# Patient Record
Sex: FEMALE | Race: OTHER | NOT HISPANIC OR LATINO | URBAN - METROPOLITAN AREA
[De-identification: names, ages, dates, MRNs, and addresses within clinical notes are randomized per-mention and may not be internally consistent; named-entity substitution may affect disease eponyms.]

---
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
## 2025-03-31 ENCOUNTER — OFFICE VISIT (OUTPATIENT)
Dept: INTERNAL MEDICINE | Facility: CLINIC | Age: 20
End: 2025-03-31
Payer: COMMERCIAL

## 2025-03-31 VITALS
OXYGEN SATURATION: 98 % | TEMPERATURE: 98 F | BODY MASS INDEX: 30.93 KG/M2 | WEIGHT: 163.81 LBS | HEART RATE: 103 BPM | DIASTOLIC BLOOD PRESSURE: 66 MMHG | SYSTOLIC BLOOD PRESSURE: 108 MMHG | HEIGHT: 61 IN

## 2025-03-31 DIAGNOSIS — F33.1 MODERATE EPISODE OF RECURRENT MAJOR DEPRESSIVE DISORDER: ICD-10-CM

## 2025-03-31 DIAGNOSIS — R53.83 FATIGUE, UNSPECIFIED TYPE: ICD-10-CM

## 2025-03-31 DIAGNOSIS — E03.9 ACQUIRED HYPOTHYROIDISM: ICD-10-CM

## 2025-03-31 DIAGNOSIS — F41.1 GENERALIZED ANXIETY DISORDER WITH PANIC ATTACKS: ICD-10-CM

## 2025-03-31 DIAGNOSIS — E04.1 THYROID NODULE: ICD-10-CM

## 2025-03-31 DIAGNOSIS — Z00.00 ENCOUNTER FOR ANNUAL PHYSICAL EXAM: Primary | ICD-10-CM

## 2025-03-31 DIAGNOSIS — L50.3 DERMATOGRAPHIA: ICD-10-CM

## 2025-03-31 DIAGNOSIS — F41.0 GENERALIZED ANXIETY DISORDER WITH PANIC ATTACKS: ICD-10-CM

## 2025-03-31 DIAGNOSIS — R45.84 ANHEDONIA: ICD-10-CM

## 2025-03-31 PROBLEM — F32.A DEPRESSION: Status: ACTIVE | Noted: 2025-03-31

## 2025-03-31 PROCEDURE — 1159F MED LIST DOCD IN RCRD: CPT | Mod: CPTII,S$GLB,, | Performed by: PHYSICIAN ASSISTANT

## 2025-03-31 PROCEDURE — 3074F SYST BP LT 130 MM HG: CPT | Mod: CPTII,S$GLB,, | Performed by: PHYSICIAN ASSISTANT

## 2025-03-31 PROCEDURE — 1160F RVW MEDS BY RX/DR IN RCRD: CPT | Mod: CPTII,S$GLB,, | Performed by: PHYSICIAN ASSISTANT

## 2025-03-31 PROCEDURE — 3008F BODY MASS INDEX DOCD: CPT | Mod: CPTII,S$GLB,, | Performed by: PHYSICIAN ASSISTANT

## 2025-03-31 PROCEDURE — 99204 OFFICE O/P NEW MOD 45 MIN: CPT | Mod: S$GLB,,, | Performed by: PHYSICIAN ASSISTANT

## 2025-03-31 PROCEDURE — 3078F DIAST BP <80 MM HG: CPT | Mod: CPTII,S$GLB,, | Performed by: PHYSICIAN ASSISTANT

## 2025-03-31 PROCEDURE — 99999 PR PBB SHADOW E&M-EST. PATIENT-LVL IV: CPT | Mod: PBBFAC,,, | Performed by: PHYSICIAN ASSISTANT

## 2025-03-31 PROCEDURE — G2211 COMPLEX E/M VISIT ADD ON: HCPCS | Mod: S$GLB,,, | Performed by: PHYSICIAN ASSISTANT

## 2025-03-31 RX ORDER — DROSPIRENONE AND ETHINYL ESTRADIOL TABLETS 0.02-3(28)
1 KIT ORAL
COMMUNITY
Start: 2024-12-29

## 2025-03-31 RX ORDER — LEVOTHYROXINE SODIUM 75 UG/1
75 TABLET ORAL
Qty: 90 TABLET | Refills: 3 | Status: SHIPPED | OUTPATIENT
Start: 2025-03-31 | End: 2026-03-31

## 2025-03-31 NOTE — PROGRESS NOTES
"  Subjective:      Patient ID: Steff Richardson is a 20 y.o. female.    Chief Complaint: Establish Care      History of Present Illness    CHIEF COMPLAINT:  Ms. Richardson presents today to establish primary care and discuss concerns about hypothyroidism    HYPOTHYROIDISM:  She was diagnosed with hypothyroidism one year ago at age 19. She initially experienced symptom improvement during the first 6 months of levothyroxine 50mcg treatment, but subsequently experienced regression of symptoms to baseline or worse. Last thyroid function check was approximately 6 months ago with fluctuating results. She reports mood changes with fluctuations between "okay" and "bad" days, fatigue, alternating constipation and diarrhea, and increased hair loss from historically thick hair. She experiences random palpitations, particularly when she has not eaten. She reports morning night sweats with whole body heat and wet bed sheets. Weight fluctuations of approximately 10 lbs monthly are reported, with recent increases noted. She has difficulty falling asleep at night and experiences daytime somnolence, even with 8-9 hours of sleep.  Reports normal menstrual periods.     FAMILY HISTORY:  Mother and sister have hypothyroidism, and grandmother has rheumatoid arthritis.    MEDICATIONS:  She takes levothyroxine 50mcg and has been on birth control for 3 years.    Medications were reviewed      Depression  Visit Type: initial  Onset of symptoms: more than 5 years ago  Progression since onset: gradually worsening  Patient presents with the following symptoms: anhedonia, depressed mood, excessive worry, fatigue, feelings of hopelessness, insomnia, irritability, nervousness/anxiety, palpitations, panic, suicidal ideas and weight gain.  Patient is not experiencing: choking sensation, confusion, decreased concentration, hyperventilation, impotence, psychomotor agitation, psychomotor retardation, restlessness, shortness of breath, suicidal planning, " thoughts of death and weight loss.  Frequency of symptoms: most days   Severity: causing significant distress   Exacerbated by: starting college.  Sleep quality: fair  Nighttime awakenings: none  Patient has a history of: anxiety/panic attacks, depression and mental illness  No history of: anemia, arrhythmia, asthma, bipolar disorder, CAD, CHF, chronic lung disease, fibromyalgia, hyperthyroidism, suicide attempt and substance abuse  Treatment tried: nothing          Problem List[1]    Current Medications[2]    Family History   Problem Relation Name Age of Onset    Hypothyroidism Mother      No Known Problems Father      Hypothyroidism Sister      Heart disease Paternal Grandfather        Review of Systems   Constitutional:  Positive for activity change, appetite change, diaphoresis, fatigue, irritability, unexpected weight change and weight gain. Negative for chills, fever and weight loss.   HENT: Negative.  Negative for congestion, hearing loss, postnasal drip, rhinorrhea, sore throat, trouble swallowing and voice change.    Eyes: Negative.  Negative for visual disturbance.   Respiratory: Negative.  Negative for cough, choking, chest tightness and shortness of breath.    Cardiovascular:  Positive for palpitations. Negative for chest pain and leg swelling.   Gastrointestinal:  Positive for constipation and diarrhea. Negative for abdominal distention, abdominal pain, blood in stool, nausea and vomiting.   Endocrine: Positive for heat intolerance. Negative for cold intolerance, polydipsia and polyuria.   Genitourinary: Negative.  Negative for difficulty urinating, frequency and impotence.   Musculoskeletal:  Negative for arthralgias, back pain, gait problem, joint swelling and myalgias.   Skin:  Negative for color change, pallor, rash and wound.   Neurological:  Negative for dizziness, tremors, weakness, light-headedness, numbness and headaches.   Hematological:  Negative for adenopathy.   Psychiatric/Behavioral:   "Positive for depression, dysphoric mood and suicidal ideas. Negative for agitation, behavioral problems, confusion, decreased concentration, hallucinations, self-injury, sleep disturbance and substance abuse. The patient is nervous/anxious and has insomnia. The patient is not hyperactive.      Objective:   /66 (BP Location: Left arm, Patient Position: Sitting)   Pulse 103   Temp 98.1 °F (36.7 °C) (Oral)   Ht 5' 1" (1.549 m)   Wt 74.3 kg (163 lb 12.8 oz)   LMP 03/23/2025 (Exact Date)   SpO2 98%   BMI 30.95 kg/m²     Physical Exam  Vitals reviewed.   Constitutional:       General: She is not in acute distress.     Appearance: Normal appearance. She is well-developed. She is not ill-appearing, toxic-appearing or diaphoretic.   HENT:      Head: Normocephalic and atraumatic.      Right Ear: External ear normal.      Left Ear: External ear normal.      Nose: Nose normal.   Eyes:      Conjunctiva/sclera: Conjunctivae normal.      Pupils: Pupils are equal, round, and reactive to light.   Neck:      Thyroid: Thyroid mass present. No thyromegaly or thyroid tenderness.      Comments: Thyroid nodule  Cardiovascular:      Rate and Rhythm: Normal rate and regular rhythm.      Heart sounds: Normal heart sounds. No murmur heard.     No friction rub. No gallop.   Pulmonary:      Effort: Pulmonary effort is normal. No respiratory distress.      Breath sounds: Normal breath sounds. No wheezing or rales.   Chest:      Chest wall: No tenderness.   Abdominal:      General: There is no distension.      Palpations: Abdomen is soft.      Tenderness: There is no abdominal tenderness.   Musculoskeletal:         General: Normal range of motion.      Cervical back: Normal range of motion and neck supple.   Lymphadenopathy:      Cervical: No cervical adenopathy.   Skin:     General: Skin is warm and dry.      Capillary Refill: Capillary refill takes less than 2 seconds.      Findings: No rash.      Comments: Dermatographia noted "   Neurological:      Mental Status: She is alert and oriented to person, place, and time.      Motor: No weakness.      Coordination: Coordination normal.      Gait: Gait normal.   Psychiatric:         Mood and Affect: Mood normal.         Behavior: Behavior normal.         Thought Content: Thought content normal.         Judgment: Judgment normal.         Assessment:     1. Encounter for annual physical exam    2. Acquired hypothyroidism    3. Thyroid nodule    4. Fatigue, unspecified type    5. Moderate episode of recurrent major depressive disorder    6. Generalized anxiety disorder with panic attacks    7. Anhedonia    8. Dermatographia      Plan:   Encounter for annual physical exam  -     BRETT Screen w/Reflex; Future; Expected date: 03/31/2025  -     CBC Auto Differential; Future; Expected date: 03/31/2025  -     Comprehensive Metabolic Panel; Future  -     Lipid Panel; Future; Expected date: 03/31/2025  -     Vitamin D; Future; Expected date: 03/31/2025  -     Thyrotropin Receptor Antibody; Future; Expected date: 03/31/2025  -     Cancel: Thyroid Peroxidase Antibody; Future; Expected date: 03/31/2025  -     US Soft Tissue Head Neck; Future; Expected date: 03/31/2025  -     Vitamin B12; Future; Expected date: 03/31/2025  Suspect thyroid dysfunction causing symptoms including mood swings, fatigue, weight fluctuations, and palpitations.  Consider autoimmune thyroid disease (e.g. Hashimoto's) given family history and presence of dermatographism.  Plan to evaluate thyroid function comprehensively with labs including TSH, free T3, free T4, and thyroid antibodies.  Will assess for other potential causes of symptoms including vitamin deficiencies, anemia, and metabolic issues.  If thyroid function normal, may consider depression as alternative diagnosis and discuss treatment options.  Discussed potential autoimmune link with dermatographia and its relevance to thyroid disease.    Thyroid nodule  Acquired  hypothyroidism  -     Thyrotropin Receptor Antibody; Future; Expected date: 03/31/2025  -     US Soft Tissue Head Neck; Future; Expected date: 03/31/2025  -     T4, Free; Future; Expected date: 03/31/2025  -     T3; Future; Expected date: 03/31/2025  -     TSH; Future; Expected date: 03/31/2025  -     SYNTHROID 75 mcg tablet; Take 1 tablet (75 mcg total) by mouth before breakfast.  Dispense: 90 tablet; Refill: 3  -     Anti-Thyroglobulin Antibody; Future; Expected date: 03/31/2025  -     Thyroid Peroxidase and Thyroglobulin Ab; Future; Expected date: 03/31/2025  - Assessed the patient's current hypothyroidism management and symptoms.  - Noted the patient has been on levothyroxine 75mcg, with last thyroid check approximately 6 months ago and fluctuating thyroid levels.  - Observed symptoms including mood swings, depression, fatigue, hair loss, dry skin, palpitations, weight fluctuations, night sweats, and difficulty falling asleep.  - Noted dermatographism on patient's skin, potentially linked to autoimmune features.  - Explained importance of consistent levothyroxine administration (empty stomach, 30 minutes before eating or taking any supplements) for proper absorption.  - Educated on variability in individual response to different thyroid medication formulations (generic vs. brand name).  - Prescribed Synthroid (brand name levothyroxine) 50 mcg daily to replace current levothyroxine, pending lab results    Fatigue, unspecified type  -     BRETT Screen w/Reflex; Future; Expected date: 03/31/2025  -     Insulin, Random; Future; Expected date: 03/31/2025    Depression, unspecified depression type  Generalized anxiety disorder with panic attacks    Anhedonia  -     Vitamin B12; Future; Expected date: 03/31/2025    Dermatographia  -reassurance      FAMILY HISTORY:  - Noted family history of hypothyroidism and rheumatoid arthritis       FOLLOW-UP:  - Scheduled follow up in 1 month to review lab results and reassess  symptoms.       This note was generated with the assistance of ambient listening technology. Verbal consent was obtained by the patient and accompanying visitor(s) for the recording of patient appointment to facilitate this note. I attest to having reviewed and edited the generated note for accuracy, though some syntax or spelling errors may persist. Please contact the author of this note for any clarification.    Follow up in about 4 weeks (around 4/28/2025), or if symptoms worsen or fail to improve.           [1]   Patient Active Problem List  Diagnosis    Thyroid disease    Dermatographia    Anhedonia    Generalized anxiety disorder with panic attacks    Depression    Fatigue   [2]   Current Outpatient Medications:     LORYNA, 28, 3-0.02 mg per tablet, Take 1 tablet by mouth., Disp: , Rfl:     spironolactone (ALDACTONE) 50 MG tablet, Take 50 mg by mouth once daily., Disp: , Rfl:     SYNTHROID 75 mcg tablet, Take 1 tablet (75 mcg total) by mouth before breakfast., Disp: 90 tablet, Rfl: 3

## 2025-04-01 ENCOUNTER — LAB VISIT (OUTPATIENT)
Dept: LAB | Facility: HOSPITAL | Age: 20
End: 2025-04-01
Payer: COMMERCIAL

## 2025-04-01 ENCOUNTER — RESULTS FOLLOW-UP (OUTPATIENT)
Dept: INTERNAL MEDICINE | Facility: CLINIC | Age: 20
End: 2025-04-01

## 2025-04-01 DIAGNOSIS — E03.9 ACQUIRED HYPOTHYROIDISM: ICD-10-CM

## 2025-04-01 DIAGNOSIS — E06.3 HASHIMOTO THYROIDITIS: Primary | ICD-10-CM

## 2025-04-01 DIAGNOSIS — R45.84 ANHEDONIA: ICD-10-CM

## 2025-04-01 DIAGNOSIS — R53.83 FATIGUE, UNSPECIFIED TYPE: ICD-10-CM

## 2025-04-01 DIAGNOSIS — Z00.00 ENCOUNTER FOR ANNUAL PHYSICAL EXAM: ICD-10-CM

## 2025-04-01 LAB
25(OH)D3+25(OH)D2 SERPL-MCNC: 50 NG/ML (ref 30–96)
ABSOLUTE EOSINOPHIL (OHS): 0.08 K/UL
ABSOLUTE MONOCYTE (OHS): 0.53 K/UL (ref 0.3–1)
ABSOLUTE NEUTROPHIL COUNT (OHS): 5.55 K/UL (ref 1.8–7.7)
ALBUMIN SERPL BCP-MCNC: 3.5 G/DL (ref 3.5–5.2)
ALP SERPL-CCNC: 84 UNIT/L (ref 40–150)
ALT SERPL W/O P-5'-P-CCNC: 13 UNIT/L (ref 10–44)
ANION GAP (OHS): 9 MMOL/L (ref 8–16)
AST SERPL-CCNC: 14 UNIT/L (ref 11–45)
BASOPHILS # BLD AUTO: 0.04 K/UL
BASOPHILS NFR BLD AUTO: 0.5 %
BILIRUB SERPL-MCNC: 0.3 MG/DL (ref 0.1–1)
BUN SERPL-MCNC: 11 MG/DL (ref 6–20)
CALCIUM SERPL-MCNC: 9.2 MG/DL (ref 8.7–10.5)
CHLORIDE SERPL-SCNC: 104 MMOL/L (ref 95–110)
CHOLEST SERPL-MCNC: 149 MG/DL (ref 120–199)
CHOLEST/HDLC SERPL: 2.3 {RATIO} (ref 2–5)
CO2 SERPL-SCNC: 24 MMOL/L (ref 23–29)
CREAT SERPL-MCNC: 0.8 MG/DL (ref 0.5–1.4)
ERYTHROCYTE [DISTWIDTH] IN BLOOD BY AUTOMATED COUNT: 11.9 % (ref 11.5–14.5)
GFR SERPLBLD CREATININE-BSD FMLA CKD-EPI: >60 ML/MIN/1.73/M2
GLUCOSE SERPL-MCNC: 84 MG/DL (ref 70–110)
HCT VFR BLD AUTO: 41.1 % (ref 37–48.5)
HDLC SERPL-MCNC: 66 MG/DL (ref 40–75)
HDLC SERPL: 44.3 % (ref 20–50)
HGB BLD-MCNC: 13.8 GM/DL (ref 12–16)
IMM GRANULOCYTES # BLD AUTO: 0.02 K/UL (ref 0–0.04)
IMM GRANULOCYTES NFR BLD AUTO: 0.2 % (ref 0–0.5)
INSULIN SERPL-ACNC: 7.9 UU/ML
LDLC SERPL CALC-MCNC: 63.8 MG/DL (ref 63–159)
LYMPHOCYTES # BLD AUTO: 2.47 K/UL (ref 1–4.8)
MCH RBC QN AUTO: 27.9 PG (ref 27–31)
MCHC RBC AUTO-ENTMCNC: 33.6 G/DL (ref 32–36)
MCV RBC AUTO: 83 FL (ref 82–98)
NONHDLC SERPL-MCNC: 83 MG/DL
NUCLEATED RBC (/100WBC) (OHS): 0 /100 WBC
PLATELET # BLD AUTO: 269 K/UL (ref 150–450)
PMV BLD AUTO: 10 FL (ref 9.2–12.9)
POTASSIUM SERPL-SCNC: 3.9 MMOL/L (ref 3.5–5.1)
PROT SERPL-MCNC: 7.8 GM/DL (ref 6–8.4)
RBC # BLD AUTO: 4.95 M/UL (ref 4–5.4)
RELATIVE EOSINOPHIL (OHS): 0.9 %
RELATIVE LYMPHOCYTE (OHS): 28.4 % (ref 18–48)
RELATIVE MONOCYTE (OHS): 6.1 % (ref 4–15)
RELATIVE NEUTROPHIL (OHS): 63.9 % (ref 38–73)
SODIUM SERPL-SCNC: 137 MMOL/L (ref 136–145)
T3FREE SERPL-MCNC: 154 NG/DL (ref 60–180)
T4 FREE SERPL-MCNC: 1.34 NG/DL (ref 0.71–1.51)
THYROGLOB AB SERPL IA-ACNC: <4 IU/ML (ref 0–3.9)
TRIGL SERPL-MCNC: 96 MG/DL (ref 30–150)
TSH SERPL-ACNC: 2.47 UIU/ML (ref 0.4–4)
VIT B12 SERPL-MCNC: 391 PG/ML (ref 210–950)
WBC # BLD AUTO: 8.69 K/UL (ref 3.9–12.7)

## 2025-04-01 PROCEDURE — 86376 MICROSOMAL ANTIBODY EACH: CPT

## 2025-04-01 PROCEDURE — 36415 COLL VENOUS BLD VENIPUNCTURE: CPT

## 2025-04-01 PROCEDURE — 86800 THYROGLOBULIN ANTIBODY: CPT

## 2025-04-01 PROCEDURE — 86235 NUCLEAR ANTIGEN ANTIBODY: CPT | Mod: 59

## 2025-04-01 PROCEDURE — 84443 ASSAY THYROID STIM HORMONE: CPT

## 2025-04-01 PROCEDURE — 84439 ASSAY OF FREE THYROXINE: CPT

## 2025-04-01 PROCEDURE — 83525 ASSAY OF INSULIN: CPT

## 2025-04-01 PROCEDURE — 86038 ANTINUCLEAR ANTIBODIES: CPT

## 2025-04-01 PROCEDURE — 84450 TRANSFERASE (AST) (SGOT): CPT

## 2025-04-01 PROCEDURE — 82607 VITAMIN B-12: CPT

## 2025-04-01 PROCEDURE — 80061 LIPID PANEL: CPT

## 2025-04-01 PROCEDURE — 83520 IMMUNOASSAY QUANT NOS NONAB: CPT

## 2025-04-01 PROCEDURE — 86225 DNA ANTIBODY NATIVE: CPT

## 2025-04-01 PROCEDURE — 85025 COMPLETE CBC W/AUTO DIFF WBC: CPT

## 2025-04-01 PROCEDURE — 82306 VITAMIN D 25 HYDROXY: CPT

## 2025-04-01 PROCEDURE — 86235 NUCLEAR ANTIGEN ANTIBODY: CPT

## 2025-04-01 PROCEDURE — 82947 ASSAY GLUCOSE BLOOD QUANT: CPT

## 2025-04-01 PROCEDURE — 84480 ASSAY TRIIODOTHYRONINE (T3): CPT

## 2025-04-02 ENCOUNTER — HOSPITAL ENCOUNTER (OUTPATIENT)
Dept: RADIOLOGY | Facility: HOSPITAL | Age: 20
Discharge: HOME | End: 2025-04-02
Attending: PHYSICIAN ASSISTANT
Payer: COMMERCIAL

## 2025-04-02 DIAGNOSIS — Z00.00 ENCOUNTER FOR ANNUAL PHYSICAL EXAM: ICD-10-CM

## 2025-04-02 DIAGNOSIS — E03.9 ACQUIRED HYPOTHYROIDISM: ICD-10-CM

## 2025-04-02 LAB
ANA (OHS): POSITIVE
ANA PATTERN 1 (OHS): ABNORMAL
ANA TITER 1 (OHS): ABNORMAL
THYROGLOB AB SERPL IA-ACNC: <1.8 IU/ML
THYROPEROXIDASE AB SERPL-ACNC: 0.3 IU/ML
TSH RECEP AB SER-ACNC: <1.1 IU/L (ref 0–1.75)

## 2025-04-02 PROCEDURE — 76536 US EXAM OF HEAD AND NECK: CPT | Mod: TC

## 2025-04-02 PROCEDURE — 76536 US EXAM OF HEAD AND NECK: CPT | Mod: 26,,, | Performed by: RADIOLOGY

## 2025-04-03 LAB
DSDNA ANTIBODY (OHS): NORMAL
DSDNA ANTIBODY TITER (OHS): NORMAL
SM  ANTIBODY (OHS): 0.11 RATIO
SM INTERPRETATION (OHS): NEGATIVE
SM/RNP ANTIBODY (OHS): 0.08 RATIO
SM/RNP INTERPRETATION (OHS): NEGATIVE
SSB  ANTIBODY (OHS): 0.11 RATIO
SSB INTERPRETATION (OHS): NEGATIVE

## 2025-04-04 ENCOUNTER — PATIENT MESSAGE (OUTPATIENT)
Dept: INTERNAL MEDICINE | Facility: CLINIC | Age: 20
End: 2025-04-04
Payer: COMMERCIAL

## 2025-04-04 ENCOUNTER — TELEPHONE (OUTPATIENT)
Dept: INTERNAL MEDICINE | Facility: CLINIC | Age: 20
End: 2025-04-04
Payer: COMMERCIAL

## 2025-04-04 DIAGNOSIS — R76.8 POSITIVE ANA (ANTINUCLEAR ANTIBODY): Primary | ICD-10-CM

## 2025-04-04 LAB
SSA  ANTIBODY (OHS): 0.09 RATIO
SSA INTERPRETATION (OHS): NEGATIVE

## 2025-04-04 NOTE — TELEPHONE ENCOUNTER
----- Message from Delia sent at 4/4/2025 11:03 AM CDT -----  Contact: ONI NAVA [[id number]]  .Type:  Patient Returning CallWho Called:ONI NAVA [[id number]]Who Left Message for Patient:Lexii Luna the patient know what this is regarding?:LabsWould the patient rather a call back or a response via [employer]? CallBe Call Back Number:[phone] Information: Patient would like call back

## 2025-04-07 ENCOUNTER — PATIENT MESSAGE (OUTPATIENT)
Dept: INTERNAL MEDICINE | Facility: CLINIC | Age: 20
End: 2025-04-07
Payer: COMMERCIAL

## 2025-04-08 ENCOUNTER — PATIENT MESSAGE (OUTPATIENT)
Dept: INTERNAL MEDICINE | Facility: CLINIC | Age: 20
End: 2025-04-08
Payer: COMMERCIAL

## 2025-04-09 ENCOUNTER — PATIENT MESSAGE (OUTPATIENT)
Dept: INTERNAL MEDICINE | Facility: CLINIC | Age: 20
End: 2025-04-09
Payer: COMMERCIAL

## 2025-04-09 DIAGNOSIS — E88.819 INSULIN RESISTANCE: Primary | ICD-10-CM

## 2025-04-09 RX ORDER — METFORMIN HYDROCHLORIDE 500 MG/1
500 TABLET, EXTENDED RELEASE ORAL
Qty: 90 TABLET | Refills: 0 | Status: SHIPPED | OUTPATIENT
Start: 2025-04-09 | End: 2026-04-09

## 2025-04-29 ENCOUNTER — OFFICE VISIT (OUTPATIENT)
Dept: INTERNAL MEDICINE | Facility: CLINIC | Age: 20
End: 2025-04-29
Payer: COMMERCIAL

## 2025-04-29 VITALS
HEART RATE: 81 BPM | HEIGHT: 61 IN | DIASTOLIC BLOOD PRESSURE: 70 MMHG | TEMPERATURE: 98 F | OXYGEN SATURATION: 98 % | WEIGHT: 161.81 LBS | SYSTOLIC BLOOD PRESSURE: 126 MMHG | BODY MASS INDEX: 30.55 KG/M2

## 2025-04-29 DIAGNOSIS — R45.84 ANHEDONIA: ICD-10-CM

## 2025-04-29 DIAGNOSIS — R61 NIGHT SWEATS: ICD-10-CM

## 2025-04-29 DIAGNOSIS — F32.0 CURRENT MILD EPISODE OF MAJOR DEPRESSIVE DISORDER, UNSPECIFIED WHETHER RECURRENT: ICD-10-CM

## 2025-04-29 DIAGNOSIS — E04.1 THYROID CYST: ICD-10-CM

## 2025-04-29 DIAGNOSIS — F41.1 GENERALIZED ANXIETY DISORDER: Primary | ICD-10-CM

## 2025-04-29 DIAGNOSIS — R53.83 FATIGUE, UNSPECIFIED TYPE: ICD-10-CM

## 2025-04-29 DIAGNOSIS — E88.819 INSULIN RESISTANCE: ICD-10-CM

## 2025-04-29 DIAGNOSIS — R76.8 POSITIVE ANA (ANTINUCLEAR ANTIBODY): ICD-10-CM

## 2025-04-29 PROCEDURE — 3074F SYST BP LT 130 MM HG: CPT | Mod: CPTII,S$GLB,, | Performed by: PHYSICIAN ASSISTANT

## 2025-04-29 PROCEDURE — 1159F MED LIST DOCD IN RCRD: CPT | Mod: CPTII,S$GLB,, | Performed by: PHYSICIAN ASSISTANT

## 2025-04-29 PROCEDURE — 99214 OFFICE O/P EST MOD 30 MIN: CPT | Mod: S$GLB,,, | Performed by: PHYSICIAN ASSISTANT

## 2025-04-29 PROCEDURE — 3078F DIAST BP <80 MM HG: CPT | Mod: CPTII,S$GLB,, | Performed by: PHYSICIAN ASSISTANT

## 2025-04-29 PROCEDURE — 99999 PR PBB SHADOW E&M-EST. PATIENT-LVL III: CPT | Mod: PBBFAC,,, | Performed by: PHYSICIAN ASSISTANT

## 2025-04-29 PROCEDURE — 3008F BODY MASS INDEX DOCD: CPT | Mod: CPTII,S$GLB,, | Performed by: PHYSICIAN ASSISTANT

## 2025-04-29 RX ORDER — METFORMIN HYDROCHLORIDE 500 MG/1
1000 TABLET, EXTENDED RELEASE ORAL
Qty: 180 TABLET | Refills: 3 | Status: SHIPPED | OUTPATIENT
Start: 2025-04-29 | End: 2026-04-29

## 2025-04-29 RX ORDER — SERTRALINE HYDROCHLORIDE 50 MG/1
50 TABLET, FILM COATED ORAL DAILY
Qty: 30 EACH | Refills: 11 | Status: SHIPPED | OUTPATIENT
Start: 2025-04-29 | End: 2026-04-29

## 2025-04-29 NOTE — PROGRESS NOTES
Subjective:      Patient ID: Steff Richardson is a 20 y.o. female.    Chief Complaint: Follow-up    HPI  History of Present Illness    CHIEF COMPLAINT:  Ms. Richardson presents today for follow up of lab results and medication management.    LABS AND IMAGING:  BRETT test was positive but nonspecific. Thyroid function tests were normal. Thyroid ultrasound showed a small cyst.    MEDICATIONS:  She tolerates Metformin well without side effects. She started brand thyroid medication 2-3 weeks ago. She has been on birth control for approximately 3 years. She was previously prescribed Zoloft but never initiated it, though now expresses interest in starting mental health medication with preference for a non-lifelong treatment option.    GYNECOLOGIC:  She reports medium to heavy menstrual periods with recent post-menstrual spotting. She denies pelvic pain or passing clots.    CONSTITUTIONAL:  She experiences intermittent night sweats that worsen with increased stress levels.    Medications were reviewed        Problem List[1]    Current Medications[2]    Review of Systems   Constitutional:  Positive for diaphoresis. Negative for activity change, appetite change, chills, fatigue, fever and unexpected weight change.   HENT: Negative.  Negative for congestion, hearing loss, postnasal drip, rhinorrhea, sore throat, trouble swallowing and voice change.    Eyes: Negative.  Negative for visual disturbance.   Respiratory: Negative.  Negative for cough, choking, chest tightness and shortness of breath.    Cardiovascular:  Negative for chest pain, palpitations and leg swelling.   Gastrointestinal:  Negative for abdominal distention, abdominal pain, blood in stool, constipation, diarrhea, nausea and vomiting.   Endocrine: Negative for cold intolerance, heat intolerance, polydipsia and polyuria.   Genitourinary:  Positive for menstrual problem. Negative for difficulty urinating and frequency.   Musculoskeletal:  Negative for arthralgias, back  "pain, gait problem, joint swelling and myalgias.   Skin:  Negative for color change, pallor, rash and wound.   Neurological:  Negative for dizziness, tremors, weakness, light-headedness, numbness and headaches.   Hematological:  Negative for adenopathy.   Psychiatric/Behavioral:  Positive for dysphoric mood. Negative for behavioral problems, confusion, self-injury, sleep disturbance and suicidal ideas. The patient is nervous/anxious.      Objective:   /70 (BP Location: Left arm, Patient Position: Sitting)   Pulse 81   Temp 98.1 °F (36.7 °C) (Tympanic)   Ht 5' 1" (1.549 m)   Wt 73.4 kg (161 lb 13.1 oz)   LMP 04/20/2025 (Exact Date)   SpO2 98%   BMI 30.58 kg/m²     Physical Exam  Vitals reviewed.   Constitutional:       General: She is not in acute distress.     Appearance: Normal appearance. She is well-developed. She is not ill-appearing, toxic-appearing or diaphoretic.   HENT:      Head: Normocephalic and atraumatic.      Right Ear: External ear normal.      Left Ear: External ear normal.      Nose: Nose normal.   Eyes:      Conjunctiva/sclera: Conjunctivae normal.      Pupils: Pupils are equal, round, and reactive to light.   Cardiovascular:      Rate and Rhythm: Normal rate and regular rhythm.      Heart sounds: Normal heart sounds. No murmur heard.     No friction rub. No gallop.   Pulmonary:      Effort: Pulmonary effort is normal. No respiratory distress.      Breath sounds: Normal breath sounds. No wheezing or rales.   Chest:      Chest wall: No tenderness.   Abdominal:      General: There is no distension.      Palpations: Abdomen is soft.      Tenderness: There is no abdominal tenderness.   Musculoskeletal:         General: Normal range of motion.      Cervical back: Normal range of motion and neck supple.   Lymphadenopathy:      Cervical: No cervical adenopathy.   Skin:     General: Skin is warm and dry.      Capillary Refill: Capillary refill takes less than 2 seconds.      Findings: No rash. "   Neurological:      Mental Status: She is alert and oriented to person, place, and time.      Motor: No weakness.      Coordination: Coordination normal.      Gait: Gait normal.   Psychiatric:         Mood and Affect: Mood normal.         Behavior: Behavior normal.         Thought Content: Thought content normal.         Judgment: Judgment normal.       Lab Visit on 04/01/2025   Component Date Value Ref Range Status    BRETT 04/01/2025 Positive (A)  Negative <1:80 Final    BRETT Titer 1 04/01/2025 1:1280   Final    BRETT Pattern 1  04/01/2025 Homogeneous   Final    Sodium 04/01/2025 137  136 - 145 mmol/L Final    Potassium 04/01/2025 3.9  3.5 - 5.1 mmol/L Final    Chloride 04/01/2025 104  95 - 110 mmol/L Final    CO2 04/01/2025 24  23 - 29 mmol/L Final    Glucose 04/01/2025 84  70 - 110 mg/dL Final    BUN 04/01/2025 11  6 - 20 mg/dL Final    Creatinine 04/01/2025 0.8  0.5 - 1.4 mg/dL Final    Calcium 04/01/2025 9.2  8.7 - 10.5 mg/dL Final    Protein Total 04/01/2025 7.8  6.0 - 8.4 gm/dL Final    Albumin 04/01/2025 3.5  3.5 - 5.2 g/dL Final    Bilirubin Total 04/01/2025 0.3  0.1 - 1.0 mg/dL Final    For infants and newborns, interpretation of results should be based   on gestational age, weight and in agreement with clinical   observations.    Premature Infant recommended reference ranges:   0-24 hours:  <8.0 mg/dL   24-48 hours: <12.0 mg/dL   3-5 days:    <15.0 mg/dL   6-29 days:   <15.0 mg/dL    ALP 04/01/2025 84  40 - 150 unit/L Final    AST 04/01/2025 14  11 - 45 unit/L Final    ALT 04/01/2025 13  10 - 44 unit/L Final    Anion Gap 04/01/2025 9  8 - 16 mmol/L Final    eGFR 04/01/2025 >60  >60 mL/min/1.73/m2 Final    Estimated GFR calculated using the CKD-EPI creatinine (2021) equation.    Cholesterol Total 04/01/2025 149  120 - 199 mg/dL Final    The National Cholesterol Education Program (NCEP) has set the  following guidelines (reference ranges) for Cholesterol:  Optimal.....................<200 mg/dL  Borderline  High.............200-239 mg/dL  High........................> or = 240 mg/dL    Triglyceride 04/01/2025 96  30 - 150 mg/dL Final    The National Cholesterol Education Program (NCEP) has set the  following guidelines (reference values) for triglycerides:  Normal......................<150 mg/dL  Borderline High.............150-199 mg/dL  High........................200-499 mg/dL    HDL Cholesterol 04/01/2025 66  40 - 75 mg/dL Final    The National Cholesterol Education Program (NCEP) has set the   following guidelines (reference values) for HDL Cholesterol:   Low...............<40 mg/dL   Optimal...........>60 mg/dL    LDL Cholesterol 04/01/2025 63.8  63.0 - 159.0 mg/dL Final    The National Cholesterol Education Program (NCEP) has set the  following guidelines (reference values) for LDL Cholesterol:  Optimal.......................<130 mg/dL  Borderline High...............130-159 mg/dL  High..........................160-189 mg/dL  Very High.....................>190 mg/dL  LDL calculated using the Friedewald equation.    HDL/Cholesterol Ratio 04/01/2025 44.3  20.0 - 50.0 % Final    Cholesterol/HDL Ratio 04/01/2025 2.3  2.0 - 5.0 Final    Non HDL Cholesterol 04/01/2025 83  mg/dL Final    Risk category and Non-HDL cholesterol goals:  Coronary heart disease (CHD)or equivalent (10-year risk of CHD >20%):  Non-HDL cholesterol goal     <130 mg/dL  Two or more CHD risk factors and 10-year risk of CHD <= 20%:  Non-HDL cholesterol goal     <160 mg/dL  0 to 1 CHD risk factor:  Non-HDL cholesterol goal     <190 mg/dL    Vitamin D 04/01/2025 50  30 - 96 ng/mL Final    Vitamin D deficiency.........<10 ng/mL                              Vitamin D insufficiency......10-29 ng/mL       Vitamin D sufficiency........> or equal to 30 ng/mL  Vitamin D toxicity............>100 ng/mL      Thyrotropin Receptor Ab 04/01/2025 <1.10  0.00 - 1.75 IU/L Final       -------------------ADDITIONAL INFORMATION-------------------  At a decision limit of  1.75 IU/L, this assay   has 97% sensitivity and 99% specificity for   detection of Graves' disease. In healthy   individuals and in patients with thyroid disease   without diagnosis of Graves' disease, the upper   limit of anti-TSHR values are 1.22 IU/L and   1.58 IU/L, respectively (97.5th percentiles).     Test Performed by:  [facility] [id number] [place] [address]  : Gregg Braswell Ph.D.; CLIA# [id number]    Vitamin B12 04/01/2025 391  210 - 950 pg/mL Final    Insulin 04/01/2025 7.9  <25.0 uU/mL Final    Free T4 04/01/2025 1.34  0.71 - 1.51 ng/dL Final    T3, Total 04/01/2025 154  60 - 180 ng/dL Final    TSH 04/01/2025 2.471  0.400 - 4.000 uIU/mL Final    Thyroglobulin Ab Screen 04/01/2025 <4.0 (H)  0.0 - 3.9 IU/mL Final    Thyroperoxidase Ab, S 04/01/2025 0.3  <9.0 IU/mL Final    Thyroglobulin Antibody, S 04/01/2025 <1.8  <4.0 IU/mL Final       -------------------ADDITIONAL INFORMATION-------------------  PLEASE NOTE:  The given thyroglobulin antibody (TgAb)   reference cutoff of <4.0 IU/mL is for the evaluation of  autoimmune thyroiditis.      A cutoff of <1.8 IU/mL may be more suitable for  the detection of potential thyroglobulin antibody (TgAb)   interference in thyroglobulin immunoassays.     The thyroglobulin antibody testing method is an   immunoenzymatic assay manufactured by [employer]   and performed on the [employer] .     Values obtained from different assay methods or kits  may be different and cannot be used interchangeably.  The results cannot be interpreted as absolute evidence for   the presence or absence of malignant disease.        Test Performed by:  [facility] [id number] [place] [address]  : Gregg Braswell Ph.D.; CLIA# [id number]    WBC 04/01/2025 8.69  3.90 - 12.70 K/uL Final    RBC 04/01/2025 4.95  4.00 - 5.40 M/uL Final    HGB  04/01/2025 13.8  12.0 - 16.0 gm/dL Final    HCT 04/01/2025 41.1  37.0 - 48.5 % Final    MCV 04/01/2025 83  82 - 98 fL Final    MCH 04/01/2025 27.9  27.0 - 31.0 pg Final    MCHC 04/01/2025 33.6  32.0 - 36.0 g/dL Final    RDW 04/01/2025 11.9  11.5 - 14.5 % Final    Platelet Count 04/01/2025 269  150 - 450 K/uL Final    MPV 04/01/2025 10.0  9.2 - 12.9 fL Final    Nucleated RBC 04/01/2025 0  <=0 /100 WBC Final    Neut % 04/01/2025 63.9  38 - 73 % Final    Lymph % 04/01/2025 28.4  18 - 48 % Final    Mono % 04/01/2025 6.1  4 - 15 % Final    Eos % 04/01/2025 0.9  <=8 % Final    Basophil % 04/01/2025 0.5  <=1.9 % Final    Imm Grans % 04/01/2025 0.2  0.0 - 0.5 % Final    Neut # 04/01/2025 5.55  1.8 - 7.7 K/uL Final    Lymph # 04/01/2025 2.47  1 - 4.8 K/uL Final    Mono # 04/01/2025 0.53  0.3 - 1 K/uL Final    Eos # 04/01/2025 0.08  <=0.5 K/uL Final    Baso # 04/01/2025 0.04  <=0.2 K/uL Final    Imm Grans # 04/01/2025 0.02  0.00 - 0.04 K/uL Final    Mild elevation in immature granulocytes is non specific and can be seen in a variety of conditions including stress response, acute inflammation, trauma and pregnancy. Correlation with other laboratory and clinical findings is essential.    dsDNA Ab Qual 04/01/2025 Negative 1:10  Negative 1:10 Final    Performed by fluorescent crithidia assay.    SSA Interpretation 04/01/2025 Negative   Final    SSA Antibody 04/01/2025 0.09  Ratio Final    SSB Interpretation 04/01/2025 Negative   Final    SSB Antibody 04/01/2025 0.11  Ratio Final    Anti Sm Antibody 04/01/2025 0.11  Ratio Final    SM Interpretation 04/01/2025 Negative   Final    Anti-Sm/RNP Interpretation 04/01/2025 Negative  Negative Final    SM/RNP Antibody 04/01/2025 0.08  Ratio Final     US Thyroid  Narrative: EXAM: US THYROID    CLINICAL HISTORY:  Hypothyroidism, unspecified.    FINDINGS:  Normal sized thyroid gland with normal homogeneous echogenicity without solid nodule.  There are (2) tiny follicular cysts, 1 in each  lobe, largest 0.3 cm.  Right lobe thyroid 4.8 x 1 x 1 cm (2 mL volume).  Isthmus 0.2 cm.  Left lobe thyroid 4.3 x 0.7 x 1 cm (2 mL volume).  Impression:  Normal thyroid ultrasound.    Finalized on: 4/2/2025 9:00 PM By:  Yehuda Durán MD  [facility]# [id number]      2025-04-02 21:02:[id number]     [facility]       Assessment:     1. Generalized anxiety disorder    2. Current mild episode of major depressive disorder, unspecified whether recurrent    3. Anhedonia    4. Fatigue, unspecified type    5. Insulin resistance    6. Positive BRETT (antinuclear antibody)    7. Thyroid cyst    8. Night sweats      Plan:     Reviewed labs, noting positive BRETT without specific indications.  Considered thyroid function, as previous US showed a small cyst.  Evaluated effectiveness of brand thyroid medication.  Assessed mood and discussed potential for antidepressant therapy.  Generalized anxiety disorder  -     sertraline (ZOLOFT) 50 MG tablet; Take 1 tablet (50 mg total) by mouth once daily.  Dispense: 30 each; Refill: 11    Current mild episode of major depressive disorder, unspecified whether recurrent  - Initiated sertraline (Zoloft) for management of depression and anxiety symptoms.  - Ms. Richardson reports mood is stable and has no prior history of psychiatric medication use.  - Educated patient that antidepressants are typically not lifelong medications and can often be tapered after 1 year of use.  - Discussed sertraline's effectiveness for treating both conditions and warned about potential initial worsening of symptoms in the first month before improvement occurs.    Anhedonia  Fatigue, unspecified type  -start sertraline    Insulin resistance  -     metFORMIN (GLUCOPHAGE-XR) 500 MG ER 24hr tablet; Take 2 tablets (1,000 mg total) by mouth daily with breakfast.  Dispense: 180 tablet; Refill: 3    Positive BRETT (antinuclear antibody)  - Documented positive but non-specific BRETT test results.  - Initiated referral to rheumatology for evaluation.  -  Advised patient to identify a preferred rheumatologist at another hospital and contact our office with this information for referral transmission.    Thyroid cyst  -noted on recent ultrasound      MENSTRUAL IRREGULARITIES:  - Ms. Richardson reports unusual spotting after menstruation with medium to heavy flow, without pelvic pain, clots, or tissue passage.  - Recommend referral to gynecology for comprehensive evaluation of these menstrual changes if they persist or worsen.    NIGHT SWEATS:  - Night sweats continue but are less frequent.  - Acknowledged these symptoms may be affected by newly prescribed medication.  - Will monitor at follow-up visits.    FOLLOW-UP:  - Scheduled follow up in 1 month to assess response to sertraline, with option for virtual appointment.       This note was generated with the assistance of ambient listening technology. Verbal consent was obtained by the patient and accompanying visitor(s) for the recording of patient appointment to facilitate this note. I attest to having reviewed and edited the generated note for accuracy, though some syntax or spelling errors may persist. Please contact the author of this note for any clarification.    Follow up in about 4 weeks (around 5/27/2025), or if symptoms worsen or fail to improve.             [1]   Patient Active Problem List  Diagnosis    Thyroid cyst    Dermatographia    Anhedonia    Generalized anxiety disorder with panic attacks    Depression    Fatigue    Insulin resistance    Positive BRETT (antinuclear antibody)    Night sweats   [2]   Current Outpatient Medications:     LORYNA, 28, 3-0.02 mg per tablet, Take 1 tablet by mouth., Disp: , Rfl:     spironolactone (ALDACTONE) 50 MG tablet, Take 50 mg by mouth once daily., Disp: , Rfl:     SYNTHROID 75 mcg tablet, Take 1 tablet (75 mcg total) by mouth before breakfast., Disp: 90 tablet, Rfl: 3    metFORMIN (GLUCOPHAGE-XR) 500 MG ER 24hr tablet, Take 2 tablets (1,000 mg total) by mouth daily with  breakfast., Disp: 180 tablet, Rfl: 3    sertraline (ZOLOFT) 50 MG tablet, Take 1 tablet (50 mg total) by mouth once daily., Disp: 30 each, Rfl: 11

## 2025-05-27 ENCOUNTER — OFFICE VISIT (OUTPATIENT)
Dept: INTERNAL MEDICINE | Facility: CLINIC | Age: 20
End: 2025-05-27
Payer: COMMERCIAL

## 2025-05-27 VITALS
HEART RATE: 95 BPM | SYSTOLIC BLOOD PRESSURE: 124 MMHG | WEIGHT: 161.81 LBS | BODY MASS INDEX: 30.55 KG/M2 | OXYGEN SATURATION: 95 % | TEMPERATURE: 98 F | HEIGHT: 61 IN | DIASTOLIC BLOOD PRESSURE: 82 MMHG

## 2025-05-27 DIAGNOSIS — F41.1 GENERALIZED ANXIETY DISORDER: ICD-10-CM

## 2025-05-27 DIAGNOSIS — E66.09 CLASS 1 OBESITY DUE TO EXCESS CALORIES WITH BODY MASS INDEX (BMI) OF 30.0 TO 30.9 IN ADULT, UNSPECIFIED WHETHER SERIOUS COMORBIDITY PRESENT: Primary | ICD-10-CM

## 2025-05-27 DIAGNOSIS — R61 NIGHT SWEATS: ICD-10-CM

## 2025-05-27 DIAGNOSIS — E88.819 INSULIN RESISTANCE: ICD-10-CM

## 2025-05-27 DIAGNOSIS — E66.811 CLASS 1 OBESITY DUE TO EXCESS CALORIES WITH BODY MASS INDEX (BMI) OF 30.0 TO 30.9 IN ADULT, UNSPECIFIED WHETHER SERIOUS COMORBIDITY PRESENT: Primary | ICD-10-CM

## 2025-05-27 PROCEDURE — 99999 PR PBB SHADOW E&M-EST. PATIENT-LVL IV: CPT | Mod: PBBFAC,,, | Performed by: PHYSICIAN ASSISTANT

## 2025-05-27 RX ORDER — DROSPIRENONE, ETHINYL ESTRADIOL AND LEVOMEFOLATE CALCIUM AND LEVOMEFOLATE CALCIUM 3-0.02(24)
1 KIT ORAL
COMMUNITY
Start: 2025-05-12

## 2025-05-27 RX ORDER — SERTRALINE HYDROCHLORIDE 50 MG/1
75 TABLET, FILM COATED ORAL DAILY
Qty: 45 TABLET | Refills: 11 | Status: SHIPPED | OUTPATIENT
Start: 2025-05-27 | End: 2026-05-27

## 2025-05-27 RX ORDER — TIRZEPATIDE 2.5 MG/.5ML
2.5 INJECTION, SOLUTION SUBCUTANEOUS
Qty: 4 PEN | Refills: 3 | Status: SHIPPED | OUTPATIENT
Start: 2025-05-27

## 2025-05-27 NOTE — PROGRESS NOTES
Subjective:      Patient ID: Steff Richardson is a 20 y.o. female.    Chief Complaint: Follow-up    HPI  History of Present Illness    CHIEF COMPLAINT:  Ms. Richardson presents today for medication follow-up.  Here today for a follow up after starting on sertraline for anhedonia.   Increased her Metformin for insulin resistance.     DEPRESSION:  She reports Zoloft has been beneficial but believes dosage increase is warranted. Her previous symptoms continue but occur more sporadically than before starting medication.    DIABETES:  She can recognize when her blood sugar drops, experiencing noticeable discomfort. She has not been feeling well with the increased Metformin dosage and observes the coating of the Metformin pill in her stool. Requesting to discuss an alternative to metformin for her insulin resistance.     THERMOREGULATION:  Her night sweats have improved slightly. She experiences daytime heat sensitivity and light sweating, even in air-conditioned environments.    Medications were reviewed    Problem List[1]    Current Medications[2]    Review of Systems   Constitutional:  Positive for diaphoresis and fatigue. Negative for activity change, appetite change, chills, fever and unexpected weight change.   HENT: Negative.  Negative for congestion, hearing loss, postnasal drip, rhinorrhea, sore throat, trouble swallowing and voice change.    Eyes: Negative.  Negative for visual disturbance.   Respiratory: Negative.  Negative for cough, choking, chest tightness and shortness of breath.    Cardiovascular:  Negative for chest pain, palpitations and leg swelling.   Gastrointestinal:  Negative for abdominal distention, abdominal pain, blood in stool, constipation, diarrhea, nausea and vomiting.   Endocrine: Positive for heat intolerance. Negative for cold intolerance, polydipsia and polyuria.   Genitourinary: Negative.  Negative for difficulty urinating and frequency.   Musculoskeletal:  Negative for arthralgias, back  "pain, gait problem, joint swelling and myalgias.   Skin:  Negative for color change, pallor, rash and wound.   Neurological:  Negative for dizziness, tremors, weakness, light-headedness, numbness and headaches.   Hematological:  Negative for adenopathy.   Psychiatric/Behavioral:  Positive for dysphoric mood. Negative for behavioral problems, confusion, self-injury, sleep disturbance and suicidal ideas. The patient is not nervous/anxious.      Objective:   /82 (BP Location: Right arm, Patient Position: Sitting)   Pulse 95   Temp 97.8 °F (36.6 °C) (Tympanic)   Ht 5' 1" (1.549 m)   Wt 73.4 kg (161 lb 13.1 oz)   LMP 05/18/2025 (Exact Date)   SpO2 95%   BMI 30.58 kg/m²     Physical Exam  Vitals and nursing note reviewed.   Constitutional:       General: She is not in acute distress.     Appearance: Normal appearance. She is well-developed. She is not ill-appearing, toxic-appearing or diaphoretic.   HENT:      Head: Normocephalic and atraumatic.   Cardiovascular:      Rate and Rhythm: Normal rate and regular rhythm.      Heart sounds: Normal heart sounds. No murmur heard.     No friction rub. No gallop.   Pulmonary:      Effort: Pulmonary effort is normal. No respiratory distress.      Breath sounds: Normal breath sounds. No wheezing or rales.   Musculoskeletal:         General: Normal range of motion.   Skin:     General: Skin is warm.      Capillary Refill: Capillary refill takes less than 2 seconds.      Findings: No rash.   Neurological:      Mental Status: She is alert and oriented to person, place, and time.      Motor: No weakness.      Gait: Gait normal.   Psychiatric:         Attention and Perception: Attention and perception normal.         Mood and Affect: Mood and affect normal.         Behavior: Behavior normal.         Thought Content: Thought content normal. Thought content is not paranoid or delusional. Thought content does not include homicidal or suicidal ideation. Thought content does not " include homicidal or suicidal plan.         Cognition and Memory: Cognition and memory normal.         Judgment: Judgment normal.         Assessment:     1. Class 1 obesity due to excess calories with body mass index (BMI) of 30.0 to 30.9 in adult, unspecified whether serious comorbidity present    2. Insulin resistance    3. Generalized anxiety disorder    4. Night sweats      Plan:   Class 1 obesity due to excess calories with body mass index (BMI) of 30.0 to 30.9 in adult, unspecified whether serious comorbidity present  -     tirzepatide, weight loss, (ZEPBOUND) 2.5 mg/0.5 mL PnIj; Inject 2.5 mg into the skin every 7 days.  Dispense: 4 Pen; Refill: 3    Insulin resistance    Generalized anxiety disorder  -     sertraline (ZOLOFT) 50 MG tablet; Take 1.5 tablets (75 mg total) by mouth once daily.  Dispense: 45 tablet; Refill: 11    Night sweats    Assessment & Plan    Considered increasing dosage due to continued symptoms, albeit less frequent. Night sweats and daytime heat sensitivity are likely side effects of initiation expected to improve with time.    MAJOR DEPRESSIVE DISORDER:  - Ms. Richardson experiencing sporadic symptoms, showing improvement but with some persistence.  - Increasing Zoloft (sertraline) dosage from current level to 75 mg daily, instructing patient to cut tablet in half initially.  - May further increase to 100 mg based on symptom response.  - Recommend Mediterranean diet as an anti-inflammatory approach to complement medication regimen.    INSULIN RESISTANCE:  - Decreased Metformin to 500 mg due to side effects including glucose drops and GI issues.  - Started Zepbound (tirzepatide) as once-weekly subcutaneous injection (pending insurance approval).  - Educated on mechanism and administration, including temporary reduction in birth control efficacy during first month of treatment; recommend additional contraception during this period.  - If unable to obtain Zepbound, patient to focus on dietary  changes and contact the office.  - Explained lifestyle modifications: follow low glycemic index diet, consume smaller frequent meals throughout the day, have largest meal earlier and smallest at night, drink 100 oz of water daily.  - Noted that tablet coating may sometimes appear in stool as a known side effect.    FOLLOW-UP:  - Follow up in 1 month.       This note was generated with the assistance of ambient listening technology. Verbal consent was obtained by the patient and accompanying visitor(s) for the recording of patient appointment to facilitate this note. I attest to having reviewed and edited the generated note for accuracy, though some syntax or spelling errors may persist. Please contact the author of this note for any clarification.      Follow up in about 4 weeks (around 6/24/2025), or if symptoms worsen or fail to improve.           [1]   Patient Active Problem List  Diagnosis    Thyroid cyst    Dermatographia    Anhedonia    Generalized anxiety disorder with panic attacks    Depression    Fatigue    Insulin resistance    Positive BRETT (antinuclear antibody)    Night sweats   [2]   Current Outpatient Medications:     drospirenone-e.[id number].[id number] (BEYAZ/TRUNG) 3-0.02-0.451 mg (24) (4) Tab, Take 1 tablet by mouth., Disp: , Rfl:     metFORMIN (GLUCOPHAGE-XR) 500 MG ER 24hr tablet, Take 2 tablets (1,000 mg total) by mouth daily with breakfast., Disp: 180 tablet, Rfl: 3    spironolactone (ALDACTONE) 50 MG tablet, Take 50 mg by mouth once daily., Disp: , Rfl:     SYNTHROID 75 mcg tablet, Take 1 tablet (75 mcg total) by mouth before breakfast., Disp: 90 tablet, Rfl: 3    LORYNA, 28, 3-0.02 mg per tablet, Take 1 tablet by mouth. (Patient not taking: Reported on 5/27/2025), Disp: , Rfl:     sertraline (ZOLOFT) 50 MG tablet, Take 1.5 tablets (75 mg total) by mouth once daily., Disp: 45 tablet, Rfl: 11    tirzepatide, weight loss, (ZEPBOUND) 2.5 mg/0.5 mL PnIj, Inject 2.5 mg into the skin every 7 days.,  Disp: 4 Pen, Rfl: 3

## 2025-07-07 ENCOUNTER — OFFICE VISIT (OUTPATIENT)
Dept: INTERNAL MEDICINE | Facility: CLINIC | Age: 20
End: 2025-07-07
Payer: COMMERCIAL

## 2025-07-07 VITALS
OXYGEN SATURATION: 95 % | BODY MASS INDEX: 31.05 KG/M2 | DIASTOLIC BLOOD PRESSURE: 68 MMHG | WEIGHT: 164.44 LBS | HEART RATE: 97 BPM | SYSTOLIC BLOOD PRESSURE: 114 MMHG | TEMPERATURE: 98 F | HEIGHT: 61 IN

## 2025-07-07 DIAGNOSIS — F41.0 GENERALIZED ANXIETY DISORDER WITH PANIC ATTACKS: ICD-10-CM

## 2025-07-07 DIAGNOSIS — F41.1 GENERALIZED ANXIETY DISORDER WITH PANIC ATTACKS: ICD-10-CM

## 2025-07-07 DIAGNOSIS — R76.8 POSITIVE ANA (ANTINUCLEAR ANTIBODY): ICD-10-CM

## 2025-07-07 DIAGNOSIS — R45.84 ANHEDONIA: ICD-10-CM

## 2025-07-07 DIAGNOSIS — F33.1 MODERATE EPISODE OF RECURRENT MAJOR DEPRESSIVE DISORDER: Primary | ICD-10-CM

## 2025-07-07 DIAGNOSIS — E88.819 INSULIN RESISTANCE: ICD-10-CM

## 2025-07-07 PROCEDURE — G2211 COMPLEX E/M VISIT ADD ON: HCPCS | Mod: S$GLB,,, | Performed by: PHYSICIAN ASSISTANT

## 2025-07-07 PROCEDURE — 99214 OFFICE O/P EST MOD 30 MIN: CPT | Mod: S$GLB,,, | Performed by: PHYSICIAN ASSISTANT

## 2025-07-07 PROCEDURE — 1160F RVW MEDS BY RX/DR IN RCRD: CPT | Mod: CPTII,S$GLB,, | Performed by: PHYSICIAN ASSISTANT

## 2025-07-07 PROCEDURE — 3074F SYST BP LT 130 MM HG: CPT | Mod: CPTII,S$GLB,, | Performed by: PHYSICIAN ASSISTANT

## 2025-07-07 PROCEDURE — 1159F MED LIST DOCD IN RCRD: CPT | Mod: CPTII,S$GLB,, | Performed by: PHYSICIAN ASSISTANT

## 2025-07-07 PROCEDURE — 3008F BODY MASS INDEX DOCD: CPT | Mod: CPTII,S$GLB,, | Performed by: PHYSICIAN ASSISTANT

## 2025-07-07 PROCEDURE — 99999 PR PBB SHADOW E&M-EST. PATIENT-LVL IV: CPT | Mod: PBBFAC,,, | Performed by: PHYSICIAN ASSISTANT

## 2025-07-07 PROCEDURE — 3078F DIAST BP <80 MM HG: CPT | Mod: CPTII,S$GLB,, | Performed by: PHYSICIAN ASSISTANT

## 2025-07-07 RX ORDER — BUPROPION HYDROCHLORIDE 150 MG/1
150 TABLET ORAL DAILY
Qty: 30 TABLET | Refills: 11 | Status: SHIPPED | OUTPATIENT
Start: 2025-07-07 | End: 2026-07-07

## 2025-07-07 NOTE — PROGRESS NOTES
"  Subjective:      Patient ID: Steff Richardson is a 20 y.o. female.    Chief Complaint: Follow-up    HPI  History of Present Illness    CHIEF COMPLAINT:  Ms. Richardson presents today for follow up of medications.    DEPRESSION AND ANXIETY:  She reports significant improvement in anxiety symptoms on Zoloft 75mg, with previous episodes occurring 2-3 times weekly now resolved. However, she notes a decline in depression management at the current dosage. She perceived gradual improvement at 50mg, but experienced a decline in depressive symptom control after increasing to 75mg. She denies prior antidepressant use. While hesitant about medication changes due to the positive impact on anxiety, she remains open to exploring alternative treatment options for depression.    CURRENT MEDICATIONS:  She continues Metformin 1000mg daily as prescribed.    MEDICAL HISTORY:  Thyroid cyst was found to be benign.    Medications were reviewed        Problem List[1]    Current Medications[2]    Review of Systems  ROS:  General: -fever, -chills, +fatigue, -weight gain, -weight loss  Eyes: -vision changes, -redness, -discharge  ENT: -ear pain, -nasal congestion, -sore throat  Cardiovascular: -chest pain, -palpitations, -lower extremity edema  Respiratory: -cough, -shortness of breath  Gastrointestinal: -abdominal pain, -nausea, -vomiting, -diarrhea, -constipation, -blood in stool  Genitourinary: -dysuria, -hematuria, -frequency  Musculoskeletal: -joint pain, -muscle pain  Skin: -rash, -lesion  Neurological: -headache, -dizziness, -numbness, -tingling  Psychiatric: +anxiety, +depression, -sleep difficulty   Objective:   /68   Pulse 97   Temp 97.6 °F (36.4 °C) (Tympanic)   Ht 5' 1" (1.549 m)   Wt 74.6 kg (164 lb 7.4 oz)   LMP 06/19/2025 (Exact Date)   SpO2 95%   BMI 31.08 kg/m²     Physical Exam  Vitals and nursing note reviewed.   Constitutional:       General: She is not in acute distress.     Appearance: Normal appearance. She is " well-developed. She is not ill-appearing, toxic-appearing or diaphoretic.   HENT:      Head: Normocephalic and atraumatic.   Cardiovascular:      Rate and Rhythm: Normal rate and regular rhythm.      Heart sounds: Normal heart sounds. No murmur heard.     No friction rub. No gallop.   Pulmonary:      Effort: Pulmonary effort is normal. No respiratory distress.      Breath sounds: Normal breath sounds. No wheezing or rales.   Musculoskeletal:         General: Normal range of motion.   Skin:     General: Skin is warm.      Capillary Refill: Capillary refill takes less than 2 seconds.      Findings: No rash.   Neurological:      Mental Status: She is alert and oriented to person, place, and time.      Motor: No weakness.      Gait: Gait normal.   Psychiatric:         Attention and Perception: Attention and perception normal.         Mood and Affect: Mood normal.         Behavior: Behavior normal.         Thought Content: Thought content normal. Thought content is not paranoid or delusional. Thought content does not include homicidal or suicidal ideation. Thought content does not include homicidal or suicidal plan.         Cognition and Memory: Cognition and memory normal.         Judgment: Judgment normal.         Assessment:     1. Moderate episode of recurrent major depressive disorder    2. Generalized anxiety disorder with panic attacks    3. Anhedonia    4. Insulin resistance    5. Positive BRETT (antinuclear antibody)      Plan:   Considered options for managing depression and anxiety, given partial response to Zoloft (sertraline).  Opted for combination therapy of Zoloft and Wellbutrin to address both anxiety and depression symptoms, explaining the concept of combination therapy and trial-and-error nature of finding the right antidepressant medication.  Noted potential side effects of Wellbutrin, including increased anxiety and jitteriness in some patients, and its potential to act as a stimulant.  Reviewed  thyroid US results, confirming presence of a benign cyst.  Evaluated current Metformin regimen and considered potential timing adjustments.    Anhedonia  Moderate episode of recurrent major depressive disorder  -     buPROPion (WELLBUTRIN XL) 150 MG TB24 tablet; Take 1 tablet (150 mg total) by mouth once daily.  Dispense: 30 tablet; Refill: 11  - Ms. Richardson reports a noticeable dip in depression while on Zoloft 75 mg, which is not working as effectively as it should be.  - Discussed switching from Zoloft to Prozac (fluoxetine).  - Will add Wellbutrin  mg daily to current Zoloft regimen to address depression symptoms.  - Ms. Richardson instructed to begin Wellbutrin when ready.  -cont sertraline at 75mg    Generalized anxiety disorder with panic attacks  - Zoloft 75 mg daily is effective for anxiety control.  - Ms. Richardson reports no longer experiencing severe anxiety episodes.  - Will continue current dosage.    Insulin resistance  - Continue Metformin 1000 mg daily, with option to split dose to twice daily if preferred.    Positive BRETT (antinuclear antibody)        FOLLOW-UP:  - Follow up in 4-6 weeks to assess response to medication changes.       This note was generated with the assistance of ambient listening technology. Verbal consent was obtained by the patient and accompanying visitor(s) for the recording of patient appointment to facilitate this note. I attest to having reviewed and edited the generated note for accuracy, though some syntax or spelling errors may persist. Please contact the author of this note for any clarification.      Follow up in about 4 weeks (around 8/4/2025), or if symptoms worsen or fail to improve.           [1]   Patient Active Problem List  Diagnosis    Thyroid cyst    Dermatographia    Anhedonia    Generalized anxiety disorder with panic attacks    Depression    Fatigue    Insulin resistance    Positive BRETT (antinuclear antibody)    Night sweats   [2]   Current Outpatient  Medications:     [id number].[id number].[id number] (BEYAZ/TRUNG) 3-0.02-0.451 mg (24) (4) Tab, Take 1 tablet by mouth., Disp: , Rfl:     metFORMIN (GLUCOPHAGE-XR) 500 MG ER 24hr tablet, Take 2 tablets (1,000 mg total) by mouth daily with breakfast., Disp: 180 tablet, Rfl: 3    sertraline (ZOLOFT) 50 MG tablet, Take 1.5 tablets (75 mg total) by mouth once daily., Disp: 45 tablet, Rfl: 11    spironolactone (ALDACTONE) 50 MG tablet, Take 50 mg by mouth once daily., Disp: , Rfl:     SYNTHROID 75 mcg tablet, Take 1 tablet (75 mcg total) by mouth before breakfast., Disp: 90 tablet, Rfl: 3    buPROPion (WELLBUTRIN XL) 150 MG TB24 tablet, Take 1 tablet (150 mg total) by mouth once daily., Disp: 30 tablet, Rfl: 11    LORYNA, 28, 3-0.02 mg per tablet, Take 1 tablet by mouth., Disp: , Rfl:

## 2025-08-27 ENCOUNTER — OFFICE VISIT (OUTPATIENT)
Dept: URGENT CARE | Facility: CLINIC | Age: 20
End: 2025-08-27
Payer: COMMERCIAL

## 2025-08-27 VITALS
OXYGEN SATURATION: 98 % | SYSTOLIC BLOOD PRESSURE: 130 MMHG | WEIGHT: 162.75 LBS | DIASTOLIC BLOOD PRESSURE: 91 MMHG | HEART RATE: 105 BPM | TEMPERATURE: 99 F | RESPIRATION RATE: 18 BRPM | BODY MASS INDEX: 30.73 KG/M2 | HEIGHT: 61 IN

## 2025-08-27 DIAGNOSIS — J02.9 SORE THROAT: ICD-10-CM

## 2025-08-27 DIAGNOSIS — H65.03 NON-RECURRENT ACUTE SEROUS OTITIS MEDIA OF BOTH EARS: Primary | ICD-10-CM

## 2025-08-27 LAB
CTP QC/QA: YES
CTP QC/QA: YES
MOLECULAR STREP A: NEGATIVE
SARS-COV+SARS-COV-2 AG RESP QL IA.RAPID: NEGATIVE

## 2025-08-27 RX ORDER — AMOXICILLIN 875 MG/1
875 TABLET, COATED ORAL 2 TIMES DAILY
Qty: 14 TABLET | Refills: 0 | Status: SHIPPED | OUTPATIENT
Start: 2025-08-27 | End: 2025-09-03